# Patient Record
Sex: FEMALE | Race: WHITE | NOT HISPANIC OR LATINO | URBAN - METROPOLITAN AREA
[De-identification: names, ages, dates, MRNs, and addresses within clinical notes are randomized per-mention and may not be internally consistent; named-entity substitution may affect disease eponyms.]

---

## 2018-08-17 ENCOUNTER — TELEPHONE (OUTPATIENT)
Dept: FAMILY MEDICINE CLINIC | Facility: CLINIC | Age: 63
End: 2018-08-17

## 2018-08-17 NOTE — TELEPHONE ENCOUNTER
DR Rubi Floyd - PT'S   IS CALLING TO SEE IF YOU WILL TAKE PT ON AS YOUR PT    SHE HAS NEVER BEEN HERE BEFORE AND HE WANTS YOU TO SEE HER  HE SAYS HE HAS BEEN SEEING YOU FOR OVER 10 YEARS  HE REFUSES FOR HER TO SEE ANYONE ELSE

## 2018-11-15 NOTE — TELEPHONE ENCOUNTER
CALLED PT'S  AND RELAYED MESSAGE  HE SAID HE HAD ALREADY SPOKEN TO HIM AND HE SAID HE WOULD TAKE PT ON